# Patient Record
Sex: MALE | Race: WHITE | NOT HISPANIC OR LATINO
[De-identification: names, ages, dates, MRNs, and addresses within clinical notes are randomized per-mention and may not be internally consistent; named-entity substitution may affect disease eponyms.]

---

## 2017-10-25 ENCOUNTER — TRANSCRIPTION ENCOUNTER (OUTPATIENT)
Age: 37
End: 2017-10-25

## 2018-03-26 ENCOUNTER — TRANSCRIPTION ENCOUNTER (OUTPATIENT)
Age: 38
End: 2018-03-26

## 2019-04-28 ENCOUNTER — TRANSCRIPTION ENCOUNTER (OUTPATIENT)
Age: 39
End: 2019-04-28

## 2019-08-06 ENCOUNTER — EMERGENCY (EMERGENCY)
Facility: HOSPITAL | Age: 39
LOS: 0 days | Discharge: HOME | End: 2019-08-06
Attending: EMERGENCY MEDICINE
Payer: COMMERCIAL

## 2019-08-06 VITALS
SYSTOLIC BLOOD PRESSURE: 134 MMHG | RESPIRATION RATE: 18 BRPM | HEART RATE: 67 BPM | OXYGEN SATURATION: 100 % | TEMPERATURE: 99 F

## 2019-08-06 VITALS — WEIGHT: 192.9 LBS | HEIGHT: 72 IN

## 2019-08-06 DIAGNOSIS — Z88.0 ALLERGY STATUS TO PENICILLIN: ICD-10-CM

## 2019-08-06 DIAGNOSIS — M70.88: ICD-10-CM

## 2019-08-06 DIAGNOSIS — R07.9 CHEST PAIN, UNSPECIFIED: ICD-10-CM

## 2019-08-06 LAB
ALBUMIN SERPL ELPH-MCNC: 4.5 G/DL — SIGNIFICANT CHANGE UP (ref 3.3–5)
ALP SERPL-CCNC: 55 U/L — SIGNIFICANT CHANGE UP (ref 40–120)
ALT FLD-CCNC: 32 U/L — SIGNIFICANT CHANGE UP (ref 12–78)
ANION GAP SERPL CALC-SCNC: 7 MMOL/L — SIGNIFICANT CHANGE UP (ref 5–17)
AST SERPL-CCNC: 24 U/L — SIGNIFICANT CHANGE UP (ref 15–37)
BILIRUB SERPL-MCNC: 1.3 MG/DL — HIGH (ref 0.2–1.2)
BUN SERPL-MCNC: 19 MG/DL — SIGNIFICANT CHANGE UP (ref 7–23)
CALCIUM SERPL-MCNC: 9 MG/DL — SIGNIFICANT CHANGE UP (ref 8.5–10.1)
CHLORIDE SERPL-SCNC: 106 MMOL/L — SIGNIFICANT CHANGE UP (ref 96–108)
CO2 SERPL-SCNC: 28 MMOL/L — SIGNIFICANT CHANGE UP (ref 22–31)
CREAT SERPL-MCNC: 0.99 MG/DL — SIGNIFICANT CHANGE UP (ref 0.5–1.3)
D DIMER BLD IA.RAPID-MCNC: <150 NG/ML DDU — SIGNIFICANT CHANGE UP
GLUCOSE SERPL-MCNC: 94 MG/DL — SIGNIFICANT CHANGE UP (ref 70–99)
HCT VFR BLD CALC: 38.5 % — LOW (ref 39–50)
HGB BLD-MCNC: 13.5 G/DL — SIGNIFICANT CHANGE UP (ref 13–17)
MCHC RBC-ENTMCNC: 33.9 PG — SIGNIFICANT CHANGE UP (ref 27–34)
MCHC RBC-ENTMCNC: 35.1 GM/DL — SIGNIFICANT CHANGE UP (ref 32–36)
MCV RBC AUTO: 96.7 FL — SIGNIFICANT CHANGE UP (ref 80–100)
PLATELET # BLD AUTO: 252 K/UL — SIGNIFICANT CHANGE UP (ref 150–400)
POTASSIUM SERPL-MCNC: 3.9 MMOL/L — SIGNIFICANT CHANGE UP (ref 3.5–5.3)
POTASSIUM SERPL-SCNC: 3.9 MMOL/L — SIGNIFICANT CHANGE UP (ref 3.5–5.3)
PROT SERPL-MCNC: 7.6 GM/DL — SIGNIFICANT CHANGE UP (ref 6–8.3)
RBC # BLD: 3.98 M/UL — LOW (ref 4.2–5.8)
RBC # FLD: 11.1 % — SIGNIFICANT CHANGE UP (ref 10.3–14.5)
SODIUM SERPL-SCNC: 141 MMOL/L — SIGNIFICANT CHANGE UP (ref 135–145)
TROPONIN I SERPL-MCNC: <0.015 NG/ML — SIGNIFICANT CHANGE UP (ref 0.01–0.04)
WBC # BLD: 12.19 K/UL — HIGH (ref 3.8–10.5)
WBC # FLD AUTO: 12.19 K/UL — HIGH (ref 3.8–10.5)

## 2019-08-06 PROCEDURE — 85027 COMPLETE CBC AUTOMATED: CPT

## 2019-08-06 PROCEDURE — 99283 EMERGENCY DEPT VISIT LOW MDM: CPT

## 2019-08-06 PROCEDURE — 71046 X-RAY EXAM CHEST 2 VIEWS: CPT | Mod: 26

## 2019-08-06 PROCEDURE — 93010 ELECTROCARDIOGRAM REPORT: CPT

## 2019-08-06 PROCEDURE — 80053 COMPREHEN METABOLIC PANEL: CPT

## 2019-08-06 PROCEDURE — 93005 ELECTROCARDIOGRAM TRACING: CPT

## 2019-08-06 PROCEDURE — 85379 FIBRIN DEGRADATION QUANT: CPT

## 2019-08-06 PROCEDURE — 36415 COLL VENOUS BLD VENIPUNCTURE: CPT

## 2019-08-06 PROCEDURE — 71046 X-RAY EXAM CHEST 2 VIEWS: CPT

## 2019-08-06 PROCEDURE — 84484 ASSAY OF TROPONIN QUANT: CPT

## 2019-08-06 PROCEDURE — 99283 EMERGENCY DEPT VISIT LOW MDM: CPT | Mod: 25

## 2019-08-06 NOTE — ED ADULT NURSE NOTE - OBJECTIVE STATEMENT
pt presents to ED c/o CP starting sudden onset today at work. unable to take deep breaths without pain. felt like someone was holding down his chest. states he has never had this pain before and denies cardiac issues. + SOB when taking a deep breath. denies significant pmhx and takes no daily meds. no active distress. EKG done in triage. LAC 20G placed and labs sent. on TX. ambulatory and unlabored respirations. RN will ctm

## 2019-08-06 NOTE — ED STATDOCS - OBJECTIVE STATEMENT
39 y/o male presents to the ED c/o constant chest pain x4 hours. States he has pain that is a 9 out of 10, starting in the chest that radiates to back when he takes a deep breath. States pain started when he was sitting down. Denies n/v, diaphoresis, pain or swelling to LE. No family hx of cardiac dz. Allergic to Penicillin. Non smoker.

## 2019-08-06 NOTE — ED ADULT NURSE NOTE - NSIMPLEMENTINTERV_GEN_ALL_ED
Implemented All Universal Safety Interventions:  [place] to call system. Call bell, personal items and telephone within reach. Instruct patient to call for assistance. Room bathroom lighting operational. Non-slip footwear when patient is off stretcher. Physically safe environment: no spills, clutter or unnecessary equipment. Stretcher in lowest position, wheels locked, appropriate side rails in place.

## 2019-08-06 NOTE — ED STATDOCS - CARE PROVIDER_API CALL
Win Jose (MD)  Cardiovascular Disease  [address] [place] [address]  Phone: [phone]  Fax: [phone]  Follow Up Time:

## 2019-08-06 NOTE — ED STATDOCS - ATTENDING CONTRIBUTION TO CARE
I,Edgar Callejas MD,  performed the initial face to face bedside interview with this patient regarding history of present illness, review of symptoms and relevant past medical, social and family history.  I completed an independent physical examination.  I was the initial provider who evaluated this patient. I have signed out the follow up of any pending tests (i.e. labs, radiological studies) to the ACP.  I have communicated the patient’s plan of care and disposition with the ACP.  The history, relevant review of systems, past medical and surgical history, medical decision making, and physical examination was documented by the scribe in my presence and I attest to the accuracy of the documentation.

## 2019-08-06 NOTE — ED STATDOCS - PROGRESS NOTE DETAILS
Patient seen and evaluated in intake with ED Attending,  ED attending note and orders reviewed, will continue with patient follow up and care, PE: cardiac: s1-s2 rrr lungs: ctab no wheezing/rhonci/rales plan: ce, ekg, cxr,  and cardiac monitor, reeval. -Annabelle Gibson PA-C pt aware of labs and cxr results, pt advised to fu with cardio and pmd, try motrin for pain if needed, pt agrees with plan and well appearing on dc. -Annabelle Gibson PA-C

## 2021-03-29 PROBLEM — Z78.9 OTHER SPECIFIED HEALTH STATUS: Chronic | Status: ACTIVE | Noted: 2019-08-06

## 2021-03-30 ENCOUNTER — APPOINTMENT (OUTPATIENT)
Dept: DISASTER EMERGENCY | Facility: OTHER | Age: 41
End: 2021-03-30
Payer: COMMERCIAL

## 2021-03-30 PROCEDURE — 0011A: CPT

## 2021-04-27 ENCOUNTER — APPOINTMENT (OUTPATIENT)
Dept: DISASTER EMERGENCY | Facility: OTHER | Age: 41
End: 2021-04-27
Payer: COMMERCIAL

## 2021-04-27 PROCEDURE — 0012A: CPT

## 2021-07-07 ENCOUNTER — TRANSCRIPTION ENCOUNTER (OUTPATIENT)
Age: 41
End: 2021-07-07

## 2023-01-03 ENCOUNTER — TRANSCRIPTION ENCOUNTER (OUTPATIENT)
Age: 43
End: 2023-01-03

## 2023-01-03 ENCOUNTER — EMERGENCY (EMERGENCY)
Facility: HOSPITAL | Age: 43
LOS: 0 days | Discharge: HOME | End: 2023-01-04
Attending: FAMILY MEDICINE
Payer: COMMERCIAL

## 2023-01-03 VITALS
OXYGEN SATURATION: 100 % | SYSTOLIC BLOOD PRESSURE: 114 MMHG | RESPIRATION RATE: 17 BRPM | WEIGHT: 225.09 LBS | HEIGHT: 72 IN | TEMPERATURE: 98 F | HEART RATE: 60 BPM | DIASTOLIC BLOOD PRESSURE: 66 MMHG

## 2023-01-03 DIAGNOSIS — F10.99 ALCOHOL USE, UNSPECIFIED WITH UNSPECIFIED ALCOHOL-INDUCED DISORDER: ICD-10-CM

## 2023-01-03 DIAGNOSIS — R05.9 COUGH, UNSPECIFIED: ICD-10-CM

## 2023-01-03 DIAGNOSIS — U07.1 COVID-19: ICD-10-CM

## 2023-01-03 DIAGNOSIS — R50.9 FEVER, UNSPECIFIED: ICD-10-CM

## 2023-01-03 DIAGNOSIS — R07.2 PRECORDIAL PAIN: ICD-10-CM

## 2023-01-03 DIAGNOSIS — Z88.0 ALLERGY STATUS TO PENICILLIN: ICD-10-CM

## 2023-01-03 PROCEDURE — 93010 ELECTROCARDIOGRAM REPORT: CPT

## 2023-01-03 PROCEDURE — 93005 ELECTROCARDIOGRAM TRACING: CPT

## 2023-01-03 PROCEDURE — 86140 C-REACTIVE PROTEIN: CPT

## 2023-01-03 PROCEDURE — 99285 EMERGENCY DEPT VISIT HI MDM: CPT | Mod: 25

## 2023-01-03 PROCEDURE — 84145 PROCALCITONIN (PCT): CPT

## 2023-01-03 PROCEDURE — 85025 COMPLETE CBC W/AUTO DIFF WBC: CPT

## 2023-01-03 PROCEDURE — 80053 COMPREHEN METABOLIC PANEL: CPT

## 2023-01-03 PROCEDURE — 74174 CTA ABD&PLVS W/CONTRAST: CPT | Mod: MA

## 2023-01-03 PROCEDURE — 85610 PROTHROMBIN TIME: CPT

## 2023-01-03 PROCEDURE — 36415 COLL VENOUS BLD VENIPUNCTURE: CPT

## 2023-01-03 PROCEDURE — 71250 CT THORAX DX C-: CPT | Mod: MA

## 2023-01-03 PROCEDURE — 84484 ASSAY OF TROPONIN QUANT: CPT

## 2023-01-03 PROCEDURE — 85379 FIBRIN DEGRADATION QUANT: CPT

## 2023-01-03 PROCEDURE — 83605 ASSAY OF LACTIC ACID: CPT

## 2023-01-03 PROCEDURE — 87040 BLOOD CULTURE FOR BACTERIA: CPT

## 2023-01-03 PROCEDURE — 71275 CT ANGIOGRAPHY CHEST: CPT | Mod: MA

## 2023-01-03 PROCEDURE — 99285 EMERGENCY DEPT VISIT HI MDM: CPT

## 2023-01-03 PROCEDURE — 96374 THER/PROPH/DIAG INJ IV PUSH: CPT

## 2023-01-03 PROCEDURE — 0241U: CPT

## 2023-01-03 PROCEDURE — 82728 ASSAY OF FERRITIN: CPT

## 2023-01-03 PROCEDURE — 71045 X-RAY EXAM CHEST 1 VIEW: CPT

## 2023-01-03 PROCEDURE — 85730 THROMBOPLASTIN TIME PARTIAL: CPT

## 2023-01-04 VITALS
DIASTOLIC BLOOD PRESSURE: 87 MMHG | RESPIRATION RATE: 17 BRPM | SYSTOLIC BLOOD PRESSURE: 123 MMHG | HEART RATE: 80 BPM | TEMPERATURE: 98 F | OXYGEN SATURATION: 98 %

## 2023-01-04 LAB
ALBUMIN SERPL ELPH-MCNC: 3.9 G/DL — SIGNIFICANT CHANGE UP (ref 3.3–5)
ALP SERPL-CCNC: 53 U/L — SIGNIFICANT CHANGE UP (ref 40–120)
ALT FLD-CCNC: 31 U/L — SIGNIFICANT CHANGE UP (ref 12–78)
ANION GAP SERPL CALC-SCNC: 5 MMOL/L — SIGNIFICANT CHANGE UP (ref 5–17)
APTT BLD: 28.8 SEC — SIGNIFICANT CHANGE UP (ref 27.5–35.5)
AST SERPL-CCNC: 25 U/L — SIGNIFICANT CHANGE UP (ref 15–37)
BASOPHILS # BLD AUTO: 0.02 K/UL — SIGNIFICANT CHANGE UP (ref 0–0.2)
BASOPHILS NFR BLD AUTO: 0.2 % — SIGNIFICANT CHANGE UP (ref 0–2)
BILIRUB SERPL-MCNC: 1 MG/DL — SIGNIFICANT CHANGE UP (ref 0.2–1.2)
BUN SERPL-MCNC: 22 MG/DL — SIGNIFICANT CHANGE UP (ref 7–23)
CALCIUM SERPL-MCNC: 8.6 MG/DL — SIGNIFICANT CHANGE UP (ref 8.5–10.1)
CHLORIDE SERPL-SCNC: 105 MMOL/L — SIGNIFICANT CHANGE UP (ref 96–108)
CO2 SERPL-SCNC: 28 MMOL/L — SIGNIFICANT CHANGE UP (ref 22–31)
CREAT SERPL-MCNC: 1.02 MG/DL — SIGNIFICANT CHANGE UP (ref 0.5–1.3)
CRP SERPL-MCNC: 5 MG/L — HIGH
D DIMER BLD IA.RAPID-MCNC: <150 NG/ML DDU — SIGNIFICANT CHANGE UP
EGFR: 94 ML/MIN/1.73M2 — SIGNIFICANT CHANGE UP
EOSINOPHIL # BLD AUTO: 0.03 K/UL — SIGNIFICANT CHANGE UP (ref 0–0.5)
EOSINOPHIL NFR BLD AUTO: 0.3 % — SIGNIFICANT CHANGE UP (ref 0–6)
FERRITIN SERPL-MCNC: 375 NG/ML — SIGNIFICANT CHANGE UP (ref 30–400)
FLUAV AG NPH QL: SIGNIFICANT CHANGE UP
FLUBV AG NPH QL: SIGNIFICANT CHANGE UP
GLUCOSE SERPL-MCNC: 124 MG/DL — HIGH (ref 70–99)
HCT VFR BLD CALC: 36.5 % — LOW (ref 39–50)
HGB BLD-MCNC: 12.6 G/DL — LOW (ref 13–17)
IMM GRANULOCYTES NFR BLD AUTO: 0.4 % — SIGNIFICANT CHANGE UP (ref 0–0.9)
INR BLD: 0.94 RATIO — SIGNIFICANT CHANGE UP (ref 0.88–1.16)
LACTATE SERPL-SCNC: 0.9 MMOL/L — SIGNIFICANT CHANGE UP (ref 0.7–2)
LYMPHOCYTES # BLD AUTO: 0.7 K/UL — LOW (ref 1–3.3)
LYMPHOCYTES # BLD AUTO: 7.2 % — LOW (ref 13–44)
MCHC RBC-ENTMCNC: 33 PG — SIGNIFICANT CHANGE UP (ref 27–34)
MCHC RBC-ENTMCNC: 34.5 GM/DL — SIGNIFICANT CHANGE UP (ref 32–36)
MCV RBC AUTO: 95.5 FL — SIGNIFICANT CHANGE UP (ref 80–100)
MONOCYTES # BLD AUTO: 0.85 K/UL — SIGNIFICANT CHANGE UP (ref 0–0.9)
MONOCYTES NFR BLD AUTO: 8.7 % — SIGNIFICANT CHANGE UP (ref 2–14)
NEUTROPHILS # BLD AUTO: 8.15 K/UL — HIGH (ref 1.8–7.4)
NEUTROPHILS NFR BLD AUTO: 83.2 % — HIGH (ref 43–77)
PLATELET # BLD AUTO: 249 K/UL — SIGNIFICANT CHANGE UP (ref 150–400)
POTASSIUM SERPL-MCNC: 4.1 MMOL/L — SIGNIFICANT CHANGE UP (ref 3.5–5.3)
POTASSIUM SERPL-SCNC: 4.1 MMOL/L — SIGNIFICANT CHANGE UP (ref 3.5–5.3)
PROCALCITONIN SERPL-MCNC: 0.07 NG/ML — SIGNIFICANT CHANGE UP (ref 0.02–0.1)
PROT SERPL-MCNC: 7.4 GM/DL — SIGNIFICANT CHANGE UP (ref 6–8.3)
PROTHROM AB SERPL-ACNC: 10.9 SEC — SIGNIFICANT CHANGE UP (ref 10.5–13.4)
RBC # BLD: 3.82 M/UL — LOW (ref 4.2–5.8)
RBC # FLD: 10.9 % — SIGNIFICANT CHANGE UP (ref 10.3–14.5)
RSV RNA NPH QL NAA+NON-PROBE: SIGNIFICANT CHANGE UP
SARS-COV-2 RNA SPEC QL NAA+PROBE: DETECTED
SODIUM SERPL-SCNC: 138 MMOL/L — SIGNIFICANT CHANGE UP (ref 135–145)
TROPONIN I, HIGH SENSITIVITY RESULT: 5.79 NG/L — SIGNIFICANT CHANGE UP
TROPONIN I, HIGH SENSITIVITY RESULT: 6.03 NG/L — SIGNIFICANT CHANGE UP
WBC # BLD: 9.79 K/UL — SIGNIFICANT CHANGE UP (ref 3.8–10.5)
WBC # FLD AUTO: 9.79 K/UL — SIGNIFICANT CHANGE UP (ref 3.8–10.5)

## 2023-01-04 PROCEDURE — 74174 CTA ABD&PLVS W/CONTRAST: CPT | Mod: 26,MA

## 2023-01-04 PROCEDURE — 71250 CT THORAX DX C-: CPT | Mod: 26,MA,59

## 2023-01-04 PROCEDURE — 71275 CT ANGIOGRAPHY CHEST: CPT | Mod: 26,MA

## 2023-01-04 PROCEDURE — 71045 X-RAY EXAM CHEST 1 VIEW: CPT | Mod: 26

## 2023-01-04 RX ORDER — SODIUM CHLORIDE 9 MG/ML
1000 INJECTION INTRAMUSCULAR; INTRAVENOUS; SUBCUTANEOUS ONCE
Refills: 0 | Status: COMPLETED | OUTPATIENT
Start: 2023-01-04 | End: 2023-01-04

## 2023-01-04 RX ORDER — ACETAMINOPHEN 500 MG
1000 TABLET ORAL ONCE
Refills: 0 | Status: COMPLETED | OUTPATIENT
Start: 2023-01-04 | End: 2023-01-04

## 2023-01-04 RX ADMIN — Medication 400 MILLIGRAM(S): at 06:46

## 2023-01-04 RX ADMIN — SODIUM CHLORIDE 1000 MILLILITER(S): 9 INJECTION INTRAMUSCULAR; INTRAVENOUS; SUBCUTANEOUS at 02:28

## 2023-01-04 RX ADMIN — SODIUM CHLORIDE 1000 MILLILITER(S): 9 INJECTION INTRAMUSCULAR; INTRAVENOUS; SUBCUTANEOUS at 08:16

## 2023-01-04 NOTE — ED PROVIDER NOTE - CARE PROVIDER_API CALL
Dirk Mast)  Thoracic Surgery  [address] [facility], [address] [place] [address]  Phone: [phone]  Fax: [phone]  Follow Up Time:     Win Jose)  Cardiovascular Disease  [address] [facility], [address]  [place] [address]  Phone: [phone]  Fax: [phone]  Follow Up Time:

## 2023-01-04 NOTE — ED PROVIDER NOTE - CARE PROVIDERS DIRECT ADDRESSES
[email or web address]

## 2023-01-04 NOTE — ED PROVIDER NOTE - OBJECTIVE STATEMENT
41 yo with no  omh who developed fever malaise cough last WEdnesday.Pt tested pos for covid at home on Thursday. States last pm developed epigastric midsternal pain rad to both shoulders which inc with breathing. Felt sob. Nonsmoker occ etoh no drugs. 43 yo with no  pmh who developed fever malaise cough last WEdnesday.Pt tested pos for covid at home on Thursday. States last pm developed epigastric midsternal pain rad to both shoulders which inc with breathing. Felt sob. Nonsmoker occ etoh no drugs.

## 2023-01-04 NOTE — ED ADULT NURSE NOTE - OBJECTIVE STATEMENT
43 y/o male, a&ox4, received to rm 22 with c/o SOB. Pt recently tested positive for covid last week, endorses increased SOB and REEVES. Denies past medical hx. NSR on cardiac monitor. Airway is patent, pt speaking in clear and coherent sentences. NSR on cardiac monitor. 18G IV placed to LAC, labs collected and sent off. Pt medicated as per MD orders. Respirations are even and unlabored, no signs of respiratory distress.

## 2023-01-04 NOTE — ED PROVIDER NOTE - PATIENT PORTAL LINK FT
You can access the FollowMyHealth Patient Portal offered by [facility] by registering at the following website: [email or web address]. By joining [facility]’s FollowMyHealth portal, you will also be able to view your health information using other applications (apps) compatible with our system.

## 2023-01-04 NOTE — ED PROVIDER NOTE - CARE PLAN
1 Principal Discharge DX:	Acute chest pain  Secondary Diagnosis:	2019 novel coronavirus disease (COVID-19)

## 2023-01-04 NOTE — ED PROVIDER NOTE - DIFFERENTIAL DIAGNOSIS
pleuritis, acs, pe, cap, covid, bronchospasm, aortic dissection, pericarditis,mvp, valvular disease Differential Diagnosis

## 2023-01-04 NOTE — ED PROVIDER NOTE - PROGRESS NOTE DETAILS
Spoke to pt and explained results. Will need to speak to thoracic regarding ct result. Requesting cookie Iqbal MD iKah PRABHAKAR: Received s/o from Dr. Iqbal- CT results d/w Dr. Mast- if continued chest pain- warrants CT imaging with IV contrast. I went to re-evaluate patient- still with chest pain at this time- CTA dissection study ordered and patient is agreeable with plan. Second troponin ordered also at this time. Kiah DO: CTA neg for dissection- patient given copy of all results and notified to f/u with both cardiology and thoracic surgery as outpatient; resting comfortably; not hypoxic; strict return precautions given.

## 2023-01-09 PROBLEM — Z00.00 ENCOUNTER FOR PREVENTIVE HEALTH EXAMINATION: Status: ACTIVE | Noted: 2023-01-09

## 2023-01-09 LAB
CULTURE RESULTS: SIGNIFICANT CHANGE UP
CULTURE RESULTS: SIGNIFICANT CHANGE UP
SPECIMEN SOURCE: SIGNIFICANT CHANGE UP
SPECIMEN SOURCE: SIGNIFICANT CHANGE UP

## 2023-01-19 ENCOUNTER — NON-APPOINTMENT (OUTPATIENT)
Age: 43
End: 2023-01-19

## 2023-01-19 ENCOUNTER — TRANSCRIPTION ENCOUNTER (OUTPATIENT)
Age: 43
End: 2023-01-19

## 2023-01-19 ENCOUNTER — APPOINTMENT (OUTPATIENT)
Dept: CARDIOLOGY | Facility: CLINIC | Age: 43
End: 2023-01-19
Payer: COMMERCIAL

## 2023-01-19 VITALS
HEART RATE: 72 BPM | HEIGHT: 72 IN | OXYGEN SATURATION: 95 % | SYSTOLIC BLOOD PRESSURE: 164 MMHG | WEIGHT: 213 LBS | DIASTOLIC BLOOD PRESSURE: 99 MMHG | BODY MASS INDEX: 28.85 KG/M2

## 2023-01-19 DIAGNOSIS — R07.89 OTHER CHEST PAIN: ICD-10-CM

## 2023-01-19 PROCEDURE — 99205 OFFICE O/P NEW HI 60 MIN: CPT

## 2023-01-19 PROCEDURE — 93000 ELECTROCARDIOGRAM COMPLETE: CPT

## 2023-01-19 NOTE — HISTORY OF PRESENT ILLNESS
[FreeTextEntry1] : Referred by ED for evaluation of chest pain.\par Also had mild aortic dilation on CT scan.\par \par Reviewed symptoms:\par pushing between shoulder blades & in front \par abrupt in onset w/ gradual worsening\par no relation to activity, breathing, eating, position, palpation\par +nausea, +vomiting, no diaphoresis.\par No prior episodes.  No dyspnea, no palpitations, some lightheadness.\par Duration of symptoms 1-2 days persisted after ED discharge.\par reviewed ED data, trop neg, d-dimer neg, CT scan.\par CT scan showed no dissection, thoracic aorta at sinus of valsalva 3.9 cm,\par mild LAD calcification premature for pt's age.\par \par no medical history other than\par meniere's disease did surgery bilat for this.\par    15 yrs ago \par occasional vertigo episodes eply maneuver\par \par no family history of CAD.\par quit smoking 9 yrs ago 1ppd x 10 yrs\par no illicits\par ETOH socially, 2x a month,\par no regular exericse prior running until COVID\par works .\par \par Reviewed lipids on pt's phone:\par HDL 83\par LDL72\par \par TG 72\par \par ECG NSR no ischemic changes.

## 2023-01-19 NOTE — ASSESSMENT
[FreeTextEntry1] : A/P:\par \par *atypical chest pain\par -Echo\par -Exercise nuclear\par -CT noncontrast cardiac to\par get calcium (agatston) score for risk stratification.\par \par \par *aortic enlargement\par -mild\par -FU w/ CT surgery\par \par Return after above to review results.\par \par

## 2023-01-23 ENCOUNTER — OUTPATIENT (OUTPATIENT)
Dept: OUTPATIENT SERVICES | Facility: HOSPITAL | Age: 43
LOS: 1 days | End: 2023-01-23
Payer: SELF-PAY

## 2023-01-23 ENCOUNTER — APPOINTMENT (OUTPATIENT)
Dept: CT IMAGING | Facility: CLINIC | Age: 43
End: 2023-01-23
Payer: SELF-PAY

## 2023-01-23 DIAGNOSIS — R07.89 OTHER CHEST PAIN: ICD-10-CM

## 2023-01-23 PROCEDURE — 75571 CT HRT W/O DYE W/CA TEST: CPT | Mod: 26

## 2023-01-23 PROCEDURE — 75571 CT HRT W/O DYE W/CA TEST: CPT

## 2023-01-24 PROBLEM — I77.810 DILATED AORTIC ROOT: Status: ACTIVE | Noted: 2023-01-24

## 2023-01-27 ENCOUNTER — APPOINTMENT (OUTPATIENT)
Dept: CARDIOTHORACIC SURGERY | Facility: CLINIC | Age: 43
End: 2023-01-27
Payer: COMMERCIAL

## 2023-01-27 VITALS — SYSTOLIC BLOOD PRESSURE: 165 MMHG | DIASTOLIC BLOOD PRESSURE: 98 MMHG

## 2023-01-27 VITALS
RESPIRATION RATE: 16 BRPM | WEIGHT: 213 LBS | OXYGEN SATURATION: 97 % | SYSTOLIC BLOOD PRESSURE: 149 MMHG | HEART RATE: 69 BPM | HEIGHT: 72 IN | BODY MASS INDEX: 28.85 KG/M2 | TEMPERATURE: 97.9 F | DIASTOLIC BLOOD PRESSURE: 106 MMHG

## 2023-01-27 DIAGNOSIS — Z86.79 PERSONAL HISTORY OF OTHER DISEASES OF THE CIRCULATORY SYSTEM: ICD-10-CM

## 2023-01-27 DIAGNOSIS — I77.810 THORACIC AORTIC ECTASIA: ICD-10-CM

## 2023-01-27 DIAGNOSIS — Z82.49 FAMILY HISTORY OF ISCHEMIC HEART DISEASE AND OTHER DISEASES OF THE CIRCULATORY SYSTEM: ICD-10-CM

## 2023-01-27 DIAGNOSIS — Z87.891 PERSONAL HISTORY OF NICOTINE DEPENDENCE: ICD-10-CM

## 2023-01-27 DIAGNOSIS — Z78.9 OTHER SPECIFIED HEALTH STATUS: ICD-10-CM

## 2023-01-27 DIAGNOSIS — Z86.69 PERSONAL HISTORY OF OTHER DISEASES OF THE NERVOUS SYSTEM AND SENSE ORGANS: ICD-10-CM

## 2023-01-27 PROCEDURE — 99204 OFFICE O/P NEW MOD 45 MIN: CPT

## 2023-01-28 NOTE — DATA REVIEWED
[FreeTextEntry1] : CT Chest No Cont at [facility] ED on 1/4/23:\par IMPRESSION:\par Aortic root is dilated to 4cm at the sinuses of Valsalva. Ectatic.\par Mid ascending thoracic aorta measures approximately 3.8-3.9cm, within limits of pulsation artifact.\par \par \par CT Angio Abdomen and Pelvis w/ IV Cont at [facility] on 1/4/23:\par Aorta: No aortic dissection. No intramural hematoma noted on the earlier same day contrast chest CT. THoracic aorta measures up to 3.9cm at the sinuses of Valsalva.\par Aortic branch vessels: Patent with a conventional branch pattern\par \par The largest cross-sectional diameters of the aorta are in mm:\par SInus of Valsalva: [id number]\par Sinotubular junction: 30\par Mid ascending aorta: 37\par Ascending proximal to the brachiocephalic: 31\par Top of the aortic arch proximal to left subclavian: 28\par Proximal descending distal to the left subclavian: 26\par Mid descending thoracic aorta: 23\par Suprarenal aorta: 20\par Infrarenal aorta: 18\par \par Celiac artery: Patent\par SMA: Patent\par KEVON: Patent\par Renal arteries: Patent\par \par IMPRESSION:\par No aortic dissection\par Thoracic aorta measures up to 3.9cm at the sinuses of Valsalva.\par Mild LAD calcification, premature for patient's age.

## 2023-01-28 NOTE — HISTORY OF PRESENT ILLNESS
[FreeTextEntry1] : Vishnu Reyes is a 42 year old male who presents for consultation, referred by [facility] ED, for evaluation of dilated aortic root.\par \par Pertinent past medical history includes former smoker.\par \par He reports having significant chest and shoulder pain a few weeks ago when he had COVID which prompted a visit to the Emergency Department at [facility]. On CT scan, he was found to have an incidental aortic root enlargement. At this point, he continues to have mild shoulder pain and chest discomfort/heaviness on occasion that comes on at random. Patient denies palpitations, shortness of breath, cough, fevers, chills, fatigue and unintentional weight loss or gain. \par \par Cardiology: Dr. Wood\par \par Coronary CT scan for calcium score performed on January 23 shows a low calcium score.  Noncontrast CT scan of the chest performed on January 4, 2023 showed the aortic root measuring 4 cm.  The ascending aorta was measured at 3.8 to 3.9 cm.  This aortic root measurement is an erroneous measurement as it was not performed with IV contrast, nor is the measurement a double oblique or centerline measurement.  Repeat CT angiogram that same day shows the ascending aorta measures 3.7 cm, and aortic root measures 3.9 x 3.8 x 3.7 cm.\par \par The patient denies family history of thoracic aortic aneurysm, aortic dissection, unexplained sudden death, bicuspid aortic valve disease or connective tissue disorder.\par \par I do not have an echocardiogram for review at this time, however the patient reports that he is scheduled for an echocardiogram with Dr. Hernández in the coming weeks.\par \par The patient's past medical history, past surgical history, family history, social history, allergies, medications, and multisystem review of systems were individually reviewed with the patient.  There are no pertinent additions or subtractions.  The patient was personally seen and examined.

## 2023-01-28 NOTE — ASSESSMENT
[FreeTextEntry1] : I had the pleasure of seeing Mr. JONE HERNANDEZ in the office today to evaluate the status of his aortic aneurysm.\par \par Briefly, this is a 42 year old male with a pertinent medical history of _ who has been followed by our office for an ascending aortic aneurysm since _. All imaging studies and tests have been thoroughly reviewed.\par \par The dedicated CT angiogram shows no evidence of thoracic aortic aneurysm.  The aortic valve appears to be trileaflet on CT scan, however this needs to be confirmed with the upcoming echocardiogram.\par \par In the office today the patient is hypertensive.  He currently takes no medication.  I instructed the patient to keep a blood pressure log 3 times per day in the coming weeks leading up to his follow-up with Dr. Hernández.  This will allow Dr. Hernández to better treat his hypertension.  This is the patient's primary cardiovascular risk at this time.\par \par With the current aortic measurements highly recommend annual echocardiography.  Of course, if the echocardiogram shows a bicuspid aortic valve, then I would scan the patient every 3 years.\par \par I would like to thank you for referring this patient to my attention and for allowing me to participate in his care.  If there are any further questions, or I can be of any further assistance, please do not hesitate contacting me at any time.

## 2023-01-28 NOTE — CONSULT LETTER
[Dear  ___] : Dear  [unfilled], [Consult Letter:] : I had the pleasure of evaluating your patient, [unfilled]. [Please see my note below.] : Please see my note below. [Consult Closing:] : Thank you very much for allowing me to participate in the care of this patient.  If you have any questions, please do not hesitate to contact me. [Sincerely,] : Sincerely, [FreeTextEntry2] : Timothy Wood MD [FreeTextEntry3] : Hector Lee MD\par Chief of Cardiovascular and Thoracic Surgery\par System Director of Endovascular and Cardiovascular Surgery\par , Cardiovascular and Thoracic Surgery\par [facility]\par [facility]\par [facility]\par [address] [place] [address]\[place] [address]\par Tel. [phone]\par Fax [phone]

## 2023-01-28 NOTE — PHYSICAL EXAM
[General Appearance - Alert] : alert [General Appearance - In No Acute Distress] : in no acute distress [General Appearance - Well Nourished] : well nourished [General Appearance - Well Developed] : well developed [General Appearance - Well-Appearing] : healthy appearing [Sclera] : the sclera and conjunctiva were normal [Outer Ear] : the ears and nose were normal in appearance [Neck Appearance] : the appearance of the neck was normal [Neck Cervical Mass (___cm)] : no neck mass was observed [Jugular Venous Distention Increased] : there was no jugular-venous distention [] : no respiratory distress [Respiration, Rhythm And Depth] : normal respiratory rhythm and effort [Exaggerated Use Of Accessory Muscles For Inspiration] : no accessory muscle use [Auscultation Breath Sounds / Voice Sounds] : lungs were clear to auscultation bilaterally [Apical Impulse] : the apical impulse was normal [Heart Rate And Rhythm] : heart rate was normal and rhythm regular [Heart Sounds] : normal S1 and S2 [Murmurs] : no murmurs [2+] : left 2+ [Bowel Sounds] : normal bowel sounds [Abdomen Soft] : soft [Abdomen Tenderness] : non-tender [Abnormal Walk] : normal gait [Skin Color & Pigmentation] : normal skin color and pigmentation [Skin Turgor] : normal skin turgor [No Focal Deficits] : no focal deficits [Oriented To Time, Place, And Person] : oriented to person, place, and time [Impaired Insight] : insight and judgment were intact [Affect] : the affect was normal [Mood] : the mood was normal [Memory Recent] : recent memory was not impaired [Memory Remote] : remote memory was not impaired [Right Carotid Bruit] : no bruit heard over the right carotid [Left Carotid Bruit] : no bruit heard over the left carotid [FreeTextEntry2] : no edema

## 2023-02-02 ENCOUNTER — APPOINTMENT (OUTPATIENT)
Dept: CARDIOLOGY | Facility: CLINIC | Age: 43
End: 2023-02-02
Payer: COMMERCIAL

## 2023-02-02 PROCEDURE — 93306 TTE W/DOPPLER COMPLETE: CPT

## 2023-02-03 ENCOUNTER — APPOINTMENT (OUTPATIENT)
Dept: CV DIAGNOSTICS | Facility: HOSPITAL | Age: 43
End: 2023-02-03

## 2023-02-07 ENCOUNTER — RESULT REVIEW (OUTPATIENT)
Age: 43
End: 2023-02-07

## 2023-02-07 ENCOUNTER — OUTPATIENT (OUTPATIENT)
Dept: OUTPATIENT SERVICES | Facility: HOSPITAL | Age: 43
LOS: 1 days | End: 2023-02-07
Payer: COMMERCIAL

## 2023-02-07 DIAGNOSIS — R07.89 OTHER CHEST PAIN: ICD-10-CM

## 2023-02-07 PROCEDURE — 93016 CV STRESS TEST SUPVJ ONLY: CPT

## 2023-02-07 PROCEDURE — 78452 HT MUSCLE IMAGE SPECT MULT: CPT | Mod: 26

## 2023-02-07 PROCEDURE — 93018 CV STRESS TEST I&R ONLY: CPT

## 2023-02-07 PROCEDURE — 78452 HT MUSCLE IMAGE SPECT MULT: CPT

## 2023-02-07 PROCEDURE — 93017 CV STRESS TEST TRACING ONLY: CPT

## 2023-02-07 PROCEDURE — A9500: CPT

## 2023-02-07 RX ORDER — REGADENOSON 0.08 MG/ML
0.4 INJECTION, SOLUTION INTRAVENOUS ONCE
Refills: 0 | Status: DISCONTINUED | OUTPATIENT
Start: 2023-02-07 | End: 2023-02-07

## 2023-02-07 NOTE — CHART NOTE - NSCHARTNOTEFT_GEN_A_CORE
Nuclear STRESS TEST REPORT    JONE HERNANDEZ 42yM  MRN: [id number]  : 80  Admit: 23    The patient exercised according to the OZIEL for 9:03mins achieving a work level of Max. METS 10.10. The resting heart rate of 48bpm adelfo to a maximal heart rate of 155bpm. This value represents 87% of the maximal, age-predicted heart rate. The resting blood pressure of 161/102mmHg, adelfo to a maximum blood pressure of 203/91mmHg. The exercise test was stopped due to protocol completion, maximum heart rate achieved       Resting ECG: abnormal bradycardiac   Functional Capacity: normal   HR response to exercise: normal   BP response to exercise: resting Hypertension- diastolic decreased with exercise  Chest pain: none   Arrhythmias: none   ST changes: non specific changes      CONCLUSION:  Hypertensive with DBP > 100 at rest. Diastolic decreased with exercise   Pt asymptomatic

## 2023-02-08 DIAGNOSIS — R07.89 OTHER CHEST PAIN: ICD-10-CM

## 2023-02-09 ENCOUNTER — NON-APPOINTMENT (OUTPATIENT)
Age: 43
End: 2023-02-09

## 2023-02-09 ENCOUNTER — APPOINTMENT (OUTPATIENT)
Dept: CARDIOLOGY | Facility: CLINIC | Age: 43
End: 2023-02-09
Payer: COMMERCIAL

## 2023-02-09 VITALS
DIASTOLIC BLOOD PRESSURE: 90 MMHG | HEIGHT: 72 IN | WEIGHT: 213 LBS | BODY MASS INDEX: 28.85 KG/M2 | SYSTOLIC BLOOD PRESSURE: 142 MMHG | OXYGEN SATURATION: 98 % | HEART RATE: 72 BPM

## 2023-02-09 DIAGNOSIS — I10 ESSENTIAL (PRIMARY) HYPERTENSION: ICD-10-CM

## 2023-02-09 PROCEDURE — 93000 ELECTROCARDIOGRAM COMPLETE: CPT

## 2023-02-09 PROCEDURE — 99214 OFFICE O/P EST MOD 30 MIN: CPT

## 2023-02-09 NOTE — ASSESSMENT
[FreeTextEntry1] : \par A/P:\par \par chlorthalidine 12.5 mg daily started for mild hypertension.\par basic chem b/f next visit.

## 2023-02-09 NOTE — HISTORY OF PRESENT ILLNESS
[FreeTextEntry1] : Referred by ED for evaluation of chest pain.\par Also had mild aortic dilation on CT scan.\par \par nuclear stress reviewed no ischemia\par CT cardiac noncontrast Ca score 5 (minimal)\par CT surgery reccomended yearly echo\par \par BPs reviewd BP diary at home reviewed: 140-170/ \par \par has had HTN off & on has been on diuretic in the past.\par Has been active off & on & had fluctuations in weight - reports losing 100 pds\par at one point in his life.

## 2023-03-02 ENCOUNTER — APPOINTMENT (OUTPATIENT)
Dept: CARDIOLOGY | Facility: CLINIC | Age: 43
End: 2023-03-02
Payer: COMMERCIAL

## 2023-03-02 VITALS
BODY MASS INDEX: 29.12 KG/M2 | DIASTOLIC BLOOD PRESSURE: 96 MMHG | WEIGHT: 215 LBS | HEART RATE: 67 BPM | OXYGEN SATURATION: 98 % | HEIGHT: 72 IN | SYSTOLIC BLOOD PRESSURE: 144 MMHG

## 2023-03-02 VITALS — SYSTOLIC BLOOD PRESSURE: 132 MMHG | DIASTOLIC BLOOD PRESSURE: 94 MMHG

## 2023-03-02 PROCEDURE — 93000 ELECTROCARDIOGRAM COMPLETE: CPT

## 2023-03-02 PROCEDURE — 99214 OFFICE O/P EST MOD 30 MIN: CPT

## 2023-03-03 ENCOUNTER — RX CHANGE (OUTPATIENT)
Age: 43
End: 2023-03-03

## 2023-03-05 NOTE — ASSESSMENT
[FreeTextEntry1] : A/P:\par \par increase chlorthal from 12.5 to 25 daily\par basic chem in 3 weeks\par Emailed literature re: HTN

## 2023-03-05 NOTE — HISTORY OF PRESENT ILLNESS
[FreeTextEntry1] : Referred by ED for evaluation of chest pain.\par Also had mild aortic dilation on CT scan & borderline HTN.\par \par reviewed basic chem on chlorthal  normal \par BPs 130s-140 manually by my measurement.

## 2023-04-21 ENCOUNTER — APPOINTMENT (OUTPATIENT)
Dept: CARDIOLOGY | Facility: CLINIC | Age: 43
End: 2023-04-21
Payer: COMMERCIAL

## 2023-04-21 VITALS
BODY MASS INDEX: 28.44 KG/M2 | HEART RATE: 63 BPM | OXYGEN SATURATION: 98 % | WEIGHT: 210 LBS | HEIGHT: 72 IN | DIASTOLIC BLOOD PRESSURE: 80 MMHG | SYSTOLIC BLOOD PRESSURE: 142 MMHG

## 2023-04-21 PROCEDURE — 93000 ELECTROCARDIOGRAM COMPLETE: CPT

## 2023-04-21 PROCEDURE — 99214 OFFICE O/P EST MOD 30 MIN: CPT

## 2023-04-21 NOTE — REASON FOR VISIT
[FreeTextEntry1] : here for FU for HTN\par \par BPs cont. to decrease on chlorthal.\par SBPs 120-130s \par \par Discussed additional med v. lifestyle changes\par to get BP at goal of <130.\par pt prefers lifestyle change.\par \par BPs 130s/100s today manually.

## 2023-04-21 NOTE — ASSESSMENT
[FreeTextEntry1] : A/P:\par \par *HTN\par *mild aortic dilation on CT scan.\par \par -Cont. med. at current dose.  pt will cont. to make lifestyle\par changes (weight loss, low Na diet)\par -Chlorthal chosen over BB/ACEI as HTN was initially diagnosed\par as borderline.  Will cont. to monitor BPs.  \par -A switch to BB\par which is more beneficial for aortic pathology\par may be appropriate in the future if BPs cont. to rise\par but given pt's young age will likely be associated w/undesired side effects.\par -ACEI/ARB may be another antiHTN\par more beneficial for aortic pathology.\par \par Return 4 months

## 2023-06-12 ENCOUNTER — RX RENEWAL (OUTPATIENT)
Age: 43
End: 2023-06-12

## 2023-07-30 LAB
ANION GAP SERPL CALC-SCNC: 12 MMOL/L
ANION GAP SERPL CALC-SCNC: 13 MMOL/L
BUN SERPL-MCNC: 21 MG/DL
BUN SERPL-MCNC: 22 MG/DL
CALCIUM SERPL-MCNC: 9.4 MG/DL
CALCIUM SERPL-MCNC: 9.6 MG/DL
CHLORIDE SERPL-SCNC: 100 MMOL/L
CHLORIDE SERPL-SCNC: 102 MMOL/L
CO2 SERPL-SCNC: 24 MMOL/L
CO2 SERPL-SCNC: 28 MMOL/L
CREAT SERPL-MCNC: 1.07 MG/DL
CREAT SERPL-MCNC: 1.11 MG/DL
EGFR: 85 ML/MIN/1.73M2
EGFR: 89 ML/MIN/1.73M2
POTASSIUM SERPL-SCNC: 3.9 MMOL/L
POTASSIUM SERPL-SCNC: 4.1 MMOL/L
SODIUM SERPL-SCNC: 140 MMOL/L
SODIUM SERPL-SCNC: 140 MMOL/L

## 2023-08-25 ENCOUNTER — APPOINTMENT (OUTPATIENT)
Dept: CARDIOLOGY | Facility: CLINIC | Age: 43
End: 2023-08-25

## 2023-10-27 ENCOUNTER — APPOINTMENT (OUTPATIENT)
Dept: CARDIOLOGY | Facility: CLINIC | Age: 43
End: 2023-10-27
Payer: COMMERCIAL

## 2023-10-27 ENCOUNTER — NON-APPOINTMENT (OUTPATIENT)
Age: 43
End: 2023-10-27

## 2023-10-27 VITALS
HEART RATE: 68 BPM | SYSTOLIC BLOOD PRESSURE: 122 MMHG | BODY MASS INDEX: 28.89 KG/M2 | OXYGEN SATURATION: 95 % | WEIGHT: 213 LBS | DIASTOLIC BLOOD PRESSURE: 70 MMHG

## 2023-10-27 PROCEDURE — 99214 OFFICE O/P EST MOD 30 MIN: CPT

## 2023-10-27 PROCEDURE — 93000 ELECTROCARDIOGRAM COMPLETE: CPT

## 2023-10-27 RX ORDER — CHLORTHALIDONE 25 MG/1
25 TABLET ORAL
Qty: 90 | Refills: 3 | Status: ACTIVE | COMMUNITY
Start: 2023-02-09 | End: 1900-01-01

## 2024-02-09 ENCOUNTER — APPOINTMENT (OUTPATIENT)
Dept: CARDIOLOGY | Facility: CLINIC | Age: 44
End: 2024-02-09
Payer: COMMERCIAL

## 2024-02-09 ENCOUNTER — NON-APPOINTMENT (OUTPATIENT)
Age: 44
End: 2024-02-09

## 2024-02-09 VITALS
SYSTOLIC BLOOD PRESSURE: 150 MMHG | OXYGEN SATURATION: 98 % | DIASTOLIC BLOOD PRESSURE: 96 MMHG | HEART RATE: 95 BPM | WEIGHT: 219 LBS | BODY MASS INDEX: 29.7 KG/M2

## 2024-02-09 PROCEDURE — 93000 ELECTROCARDIOGRAM COMPLETE: CPT

## 2024-02-09 PROCEDURE — G2211 COMPLEX E/M VISIT ADD ON: CPT

## 2024-02-09 PROCEDURE — 99214 OFFICE O/P EST MOD 30 MIN: CPT

## 2024-02-09 RX ORDER — CARVEDILOL 3.12 MG/1
3.12 TABLET, FILM COATED ORAL TWICE DAILY
Qty: 62 | Refills: 3 | Status: DISCONTINUED | COMMUNITY
Start: 2024-02-09 | End: 2024-02-09

## 2024-02-09 NOTE — HISTORY OF PRESENT ILLNESS
[FreeTextEntry1] : [id number]  JONE HERNANDEZ  Dec 14 1980 [phone]   43y  *HTN *mild aortic dilation on CT scan.  here for followup. Last visit echo ordered to evaluate mildly dilated aorta yearly echo planned.  here to review results.  missed echo apt this AM. Felt palpitations, lasted 45 sec then resolved never had this before.  Reviewed applewatch tracing - 110s HRs appears to be sinus tracing quality intermediate.  BPs at home have increased to 140-150s considering starting med. taking chlorthal 25 daily has been taking regularly. wts have increased,  stress increased  manual BP 130s/100s   NSR 80s no ischemic changes. ==================== CARDIAC TESTING: *ECGs NSR no ischemic changes *Nuclear exercise Feb '23: normal perfusion no ischemia *Echo Feb '23: EF 60-65%, mildlly dilated aortic root 3.7 cm mild LAE *CT cardiac - Agatston score =5 all in LAD *CT chest Jan '23: Aorta: No aortic dissection. No intramural hematoma noted on the earlier same day noncontrast chest CT. Thoracic aorta measures up to 3.9 cm at the sinuses of Valsalva; detailed measurements below. The largest cross-sectional diameters of the aorta are in mm: Sinus of Valsalva: 39 x 38 x 37 Sinotubular junction: 30 Mid ascending aorta: 37 Ascending proximal to the brachiocephalic: 31 Top of the aortic arch proximal to left subclavian: 28 Proximal descending distal to left subclavian: 26 Mid descending thoracic aorta: 23 Aorta at the diaphragm: 23 Suprarenal aorta: 20 Infrarenal aorta: 18 Heart: The heart is normal in size. No pericardial effusion. Mild LAD calcification, premature for patient's age. IMPRESSION: No aortic dissection. Thoracic aorta measures up to 3.9 cm at the sinuses of Valsalva; detailed measurements above. Mild LAD calcification, premature for patient's age. ======================================= HISTORY: *Jan '23: referred for chest pain, mild aortic dialtion on CT scan (also referred to CT surg) exercise nuc, CT scan noncon ordered for Ca score, *Feb '23: nuc neg, CT scan min ca, CT surg recc yearly echo chlorthal started for BP *Mar '23: chlorthal increased to 25 *Apr ;23: no changes in meds. =======================================

## 2024-02-09 NOTE — ASSESSMENT
[FreeTextEntry1] : A/P:  *HTN *mild aortic dilation on CT scan.  -manual BP 130s in office. -24 hr cuff, BP diary, will bring cuff to next visit.  Return in  2 weeks w/ echo to review echo & BP.

## 2024-02-12 ENCOUNTER — APPOINTMENT (OUTPATIENT)
Dept: CARDIOLOGY | Facility: CLINIC | Age: 44
End: 2024-02-12
Payer: COMMERCIAL

## 2024-02-12 ENCOUNTER — NON-APPOINTMENT (OUTPATIENT)
Age: 44
End: 2024-02-12

## 2024-02-12 PROCEDURE — 93784 AMBL BP MNTR W/SOFTWARE: CPT

## 2024-03-01 ENCOUNTER — APPOINTMENT (OUTPATIENT)
Dept: CARDIOLOGY | Facility: CLINIC | Age: 44
End: 2024-03-01
Payer: COMMERCIAL

## 2024-03-01 VITALS
DIASTOLIC BLOOD PRESSURE: 94 MMHG | HEIGHT: 72 IN | SYSTOLIC BLOOD PRESSURE: 140 MMHG | BODY MASS INDEX: 30.34 KG/M2 | HEART RATE: 56 BPM | WEIGHT: 224 LBS | OXYGEN SATURATION: 99 %

## 2024-03-01 PROCEDURE — 99204 OFFICE O/P NEW MOD 45 MIN: CPT

## 2024-03-01 PROCEDURE — G2211 COMPLEX E/M VISIT ADD ON: CPT

## 2024-03-01 PROCEDURE — 93306 TTE W/DOPPLER COMPLETE: CPT

## 2024-03-01 NOTE — PHYSICAL EXAM
[Well Developed] : well developed [Well Nourished] : well nourished [No Acute Distress] : no acute distress [Normal Conjunctiva] : normal conjunctiva [Normal Venous Pressure] : normal venous pressure [No Carotid Bruit] : no carotid bruit [Normal S1, S2] : normal S1, S2 [No Murmur] : no murmur [No Rub] : no rub [No Gallop] : no gallop [Clear Lung Fields] : clear lung fields [Good Air Entry] : good air entry [No Respiratory Distress] : no respiratory distress  [Soft] : abdomen soft [Non Tender] : non-tender [No Masses/organomegaly] : no masses/organomegaly [Normal Bowel Sounds] : normal bowel sounds [Normal Gait] : normal gait [No Edema] : no edema [No Cyanosis] : no cyanosis [No Varicosities] : no varicosities [No Clubbing] : no clubbing [No Rash] : no rash [No Skin Lesions] : no skin lesions [No Focal Deficits] : no focal deficits [Moves all extremities] : moves all extremities [Normal Speech] : normal speech [Alert and Oriented] : alert and oriented [Normal memory] : normal memory

## 2024-03-01 NOTE — HISTORY OF PRESENT ILLNESS
[FreeTextEntry1] : [id number] JONE HERNANDEZ Dec 14 1980 [phone]  43y  *HTN *mild aortic dilation on CT scan.  Reviewed 24 hr BP cuff data: ave /95 awake 143/110 sleep 125/77  Pt brought home BP cuff: 137/103  machine omron 152/101  machine Qardio  my manual office measurement w/ 2 different size cuffs 140/100  Reviewed echo today:  Echo mar '23: EF 60% aortic root dilated 4 cm (indexed 1.84 cm/m2), ascending aorta 3.7 (indexed 1.71)

## 2024-03-25 ENCOUNTER — RX CHANGE (OUTPATIENT)
Age: 44
End: 2024-03-25

## 2024-03-25 RX ORDER — METOPROLOL TARTRATE 25 MG/1
25 TABLET, FILM COATED ORAL TWICE DAILY
Qty: 90 | Refills: 3 | Status: ACTIVE | COMMUNITY
Start: 2024-03-01 | End: 1900-01-01

## 2024-04-19 ENCOUNTER — APPOINTMENT (OUTPATIENT)
Dept: CARDIOLOGY | Facility: CLINIC | Age: 44
End: 2024-04-19
Payer: COMMERCIAL

## 2024-04-19 VITALS
DIASTOLIC BLOOD PRESSURE: 70 MMHG | HEART RATE: 58 BPM | WEIGHT: 224 LBS | BODY MASS INDEX: 30.38 KG/M2 | SYSTOLIC BLOOD PRESSURE: 110 MMHG | OXYGEN SATURATION: 98 %

## 2024-04-19 PROCEDURE — 99214 OFFICE O/P EST MOD 30 MIN: CPT

## 2024-08-23 ENCOUNTER — APPOINTMENT (OUTPATIENT)
Dept: CARDIOLOGY | Facility: CLINIC | Age: 44
End: 2024-08-23

## 2024-08-30 ENCOUNTER — APPOINTMENT (OUTPATIENT)
Dept: CARDIOLOGY | Facility: CLINIC | Age: 44
End: 2024-08-30